# Patient Record
Sex: MALE | Race: WHITE | NOT HISPANIC OR LATINO | Employment: FULL TIME | URBAN - METROPOLITAN AREA
[De-identification: names, ages, dates, MRNs, and addresses within clinical notes are randomized per-mention and may not be internally consistent; named-entity substitution may affect disease eponyms.]

---

## 2019-12-11 ENCOUNTER — OFFICE VISIT (OUTPATIENT)
Dept: ENDOCRINOLOGY | Facility: CLINIC | Age: 44
End: 2019-12-11

## 2019-12-11 VITALS
HEART RATE: 94 BPM | WEIGHT: 178.6 LBS | HEIGHT: 67 IN | SYSTOLIC BLOOD PRESSURE: 120 MMHG | DIASTOLIC BLOOD PRESSURE: 84 MMHG | OXYGEN SATURATION: 96 % | BODY MASS INDEX: 28.03 KG/M2

## 2019-12-11 DIAGNOSIS — G62.9 NEUROPATHY: ICD-10-CM

## 2019-12-11 DIAGNOSIS — E13.9 LADA (LATENT AUTOIMMUNE DIABETES IN ADULTS), MANAGED AS TYPE 2 (HCC): Primary | ICD-10-CM

## 2019-12-11 PROBLEM — K29.70 GASTRITIS: Status: ACTIVE | Noted: 2019-12-11

## 2019-12-11 PROBLEM — E78.5 HYPERLIPIDEMIA: Status: ACTIVE | Noted: 2019-12-11

## 2019-12-11 PROBLEM — E11.40 DIABETIC NEUROPATHY (HCC): Status: ACTIVE | Noted: 2019-12-11

## 2019-12-11 PROBLEM — I10 BENIGN HYPERTENSION: Status: ACTIVE | Noted: 2019-12-11

## 2019-12-11 LAB
GLUCOSE BLDC GLUCOMTR-MCNC: 106 MG/DL (ref 70–130)
HBA1C MFR BLD: 7.5 %

## 2019-12-11 PROCEDURE — 99214 OFFICE O/P EST MOD 30 MIN: CPT | Performed by: INTERNAL MEDICINE

## 2019-12-11 PROCEDURE — 83036 HEMOGLOBIN GLYCOSYLATED A1C: CPT | Performed by: INTERNAL MEDICINE

## 2019-12-11 PROCEDURE — 82947 ASSAY GLUCOSE BLOOD QUANT: CPT | Performed by: INTERNAL MEDICINE

## 2019-12-11 RX ORDER — RAMIPRIL 1.25 MG/1
1.25 CAPSULE ORAL DAILY
COMMUNITY

## 2019-12-11 RX ORDER — OMEPRAZOLE 40 MG/1
CAPSULE, DELAYED RELEASE ORAL
COMMUNITY
Start: 2015-10-30

## 2019-12-11 RX ORDER — ATORVASTATIN CALCIUM 40 MG/1
1 TABLET, FILM COATED ORAL
COMMUNITY
Start: 2015-09-28

## 2019-12-11 RX ORDER — DULOXETIN HYDROCHLORIDE 20 MG/1
20 CAPSULE, DELAYED RELEASE ORAL DAILY
Qty: 30 CAPSULE | Refills: 2 | Status: SHIPPED | OUTPATIENT
Start: 2019-12-11 | End: 2020-03-02

## 2019-12-11 RX ORDER — IBUPROFEN 800 MG/1
800 TABLET ORAL EVERY 6 HOURS PRN
COMMUNITY

## 2019-12-11 NOTE — PROGRESS NOTES
"Chief Complaint   Patient presents with   • Diabetes     Previous pt here today for a follow up        HPI   Mars Nazario is a 44 y.o. male had concerns including Diabetes (Previous pt here today for a follow up).    Pt has FABY. Diagnosed age 36. Managed as type 2 DM.  He is monitoring blood sugars 2 times per day. BGs range 150-200s in the mornings. Afternoons are ok - around .   He has hypoglycemia on occasion. Thinks he had a BG drop on the way here.   Lowest BG was 79 in the last few months.   Is taking mixed insulin 40 units AM and PM dose at 50. Takes his first dose of insulin around 10:30 AM and second dose 7-8 PM.   He tried to get cymbalta for neuropathy but had some insurance issues. The pharmacy didn't contact me regarding this at my new office, therefore, I was unaware.  He does not recall why he is not on metformin. Doesn't recall when labs were last done.   Last ophtho exam was a year ago. Is due for another.       The following portions of the patient's history were reviewed and updated as appropriate: allergies, current medications, past family history, past medical history, past social history, past surgical history and problem list.    Review of Systems   Constitutional: Negative.    HENT: Positive for congestion and sinus pressure.    Eyes: Negative.    Respiratory: Positive for cough, shortness of breath and wheezing.    Cardiovascular: Negative.    Gastrointestinal: Positive for abdominal pain and constipation.   Endocrine: Negative.    Genitourinary: Negative.    Musculoskeletal: Positive for back pain and myalgias.   Skin: Negative.    Allergic/Immunologic: Negative.    Neurological: Negative.    Hematological: Negative.    Psychiatric/Behavioral: Negative.       ROS was reviewed and verified. All other ROS negative.    /84 (BP Location: Left arm, Patient Position: Sitting, Cuff Size: Adult)   Pulse 94   Ht 170.2 cm (67\")   Wt 81 kg (178 lb 9.6 oz)   SpO2 96%   BMI 27.97 " kg/m²      Physical Exam     Constitutional:  well developed; well nourished  no acute distress   ENT/Thyroid: no thyromegaly  no palpable nodules   Eyes: EOM intact  Conjunctiva: clear   Respiratory:  breathing is unlabored  clear to auscultation bilaterally   Cardiovascular:  regular rate and rhythm, S1, S2 normal, no murmur, click, rub or gallop   Chest:  Not performed.   Abdomen: Not performed.   : Not performed.   Musculoskeletal: negative findings:  ROM of all joints is normal, no deformities present   Skin: dry and warm   Neuro: normal without focal findings, mental status, speech normal, alert and oriented x3 and SONY   Psych: oriented to time, place and person, mood and affect are within normal limits     CMP:  Lab Results   Component Value Date    BUN 9 09/25/2015    CREATININE 0.9 09/25/2015     09/25/2015    K 4.4 09/25/2015    CO2 31 09/25/2015    CALCIUM 9.3 09/25/2015    ALBUMIN 4.3 09/25/2015    BILITOT 0.3 09/25/2015    ALKPHOS 72 09/25/2015    AST 18 09/25/2015    ALT 36 09/25/2015     Lipid Panel:  Lab Results   Component Value Date    TRIG 417 (H) 09/25/2015    HDL 37 (L) 09/25/2015     (H) 09/25/2015     HbA1c:  Lab Results   Component Value Date    HGBA1C 7.5 12/11/2019    HGBA1C 10.5 (H) 09/25/2015     Glucose:    Lab Results   Component Value Date    POCGLU 106 12/11/2019     Microalbumin:  Lab Results   Component Value Date    MICROALBUR <3.0 09/25/2015     Assessment and Plan    Mars was seen today for diabetes.    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 Complicated by neuropathy.   Continue mixed insulin 40 units AM. Increase PM dose to 54 units. His scripts will be written by his PCP for the [address] pricing.   Unclear why he isn't on metformin. Records will be requested. Labs are due. Ensure Cr/GFR are stable prior to starting metformin.   Other meds  limited due to cost, though if GFR is normal, consider farxiga.   Ophtho exam is due - pt will schedule.   -     POC Glucose  -     POC Glycosylated Hemoglobin (Hb A1C)  -     CBC (No Diff); Future  -     Lipid Panel; Future  -     Comprehensive Metabolic Panel; Future  -     TSH; Future  -     Microalbumin / Creatinine Urine Ratio - Urine, Clean Catch; Future  -     insulin lispro protamine-insulin lispro (HUMALOG MIX 75/25) (75-25) 100 UNIT/ML suspension injection; 40 units every morning and 54 units every evening    Neuropathy  Script for cymbalta sent.   -     DULoxetine (CYMBALTA) 20 MG capsule; Take 1 capsule by mouth Daily.     Return in about 3 months (around 3/11/2020) for next scheduled follow up. The patient was instructed to contact the clinic with any interval questions or concerns.    **Addendum 12/12/2019:    Records received from [facility].  Labs were last updated January 2019.  Monofilament checked 10/2018.      Zahra Govea, DO   Endocrinologist    Please note that portions of this document were completed with a voice recognition program. Efforts were made to edit the dictations, but occasionally words are mis-transcribed.

## 2020-02-29 DIAGNOSIS — G62.9 NEUROPATHY: ICD-10-CM

## 2020-03-02 RX ORDER — DULOXETIN HYDROCHLORIDE 20 MG/1
CAPSULE, DELAYED RELEASE ORAL
Qty: 30 CAPSULE | Refills: 1 | Status: SHIPPED | OUTPATIENT
Start: 2020-03-02